# Patient Record
Sex: MALE | Race: OTHER | Employment: OTHER | URBAN - METROPOLITAN AREA
[De-identification: names, ages, dates, MRNs, and addresses within clinical notes are randomized per-mention and may not be internally consistent; named-entity substitution may affect disease eponyms.]

---

## 2017-09-14 ENCOUNTER — PREPPED CHART (OUTPATIENT)
Dept: URBAN - METROPOLITAN AREA CLINIC 43 | Facility: CLINIC | Age: 67
End: 2017-09-14

## 2018-01-03 ENCOUNTER — ESTABLISHED COMPREHENSIVE EXAM (OUTPATIENT)
Dept: URBAN - METROPOLITAN AREA CLINIC 43 | Facility: CLINIC | Age: 68
End: 2018-01-03

## 2018-01-03 DIAGNOSIS — Z96.1: ICD-10-CM

## 2018-01-03 DIAGNOSIS — H04.123: ICD-10-CM

## 2018-01-03 DIAGNOSIS — E11.9: ICD-10-CM

## 2018-01-03 DIAGNOSIS — H26.493: ICD-10-CM

## 2018-01-03 PROCEDURE — 92015 DETERMINE REFRACTIVE STATE: CPT

## 2018-01-03 PROCEDURE — 92014 COMPRE OPH EXAM EST PT 1/>: CPT

## 2018-01-03 ASSESSMENT — TONOMETRY
OD_IOP_MMHG: 14
OS_IOP_MMHG: 14

## 2018-01-03 ASSESSMENT — VISUAL ACUITY
OS_CC: 20/25-2
OD_SC: J5
OS_SC: J8
OD_SC: 20/40-3
OD_CC: 20/30+3
OD_CC: J2
OS_SC: 20/40
OS_CC: J3

## 2019-02-20 ENCOUNTER — ESTABLISHED COMPREHENSIVE EXAM (OUTPATIENT)
Dept: URBAN - METROPOLITAN AREA CLINIC 43 | Facility: CLINIC | Age: 69
End: 2019-02-20

## 2019-02-20 DIAGNOSIS — E11.3292: ICD-10-CM

## 2019-02-20 DIAGNOSIS — Z96.1: ICD-10-CM

## 2019-02-20 DIAGNOSIS — H04.123: ICD-10-CM

## 2019-02-20 DIAGNOSIS — H26.493: ICD-10-CM

## 2019-02-20 PROCEDURE — 92014 COMPRE OPH EXAM EST PT 1/>: CPT

## 2019-02-20 PROCEDURE — 92015 DETERMINE REFRACTIVE STATE: CPT

## 2019-02-20 ASSESSMENT — VISUAL ACUITY
OD_BAT: 20/200
OS_SC: 20/30-3
OS_SC: J6
OD_SC: 20/50+2
OD_SC: J6
OS_CC: 20/20-1
OD_CC: J6
OD_CC: 20/30+2

## 2019-02-20 ASSESSMENT — TONOMETRY
OD_IOP_MMHG: 14
OS_IOP_MMHG: 14

## 2019-09-04 NOTE — PATIENT DISCUSSION
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 Discussed with patient and his wife that surgery would help correct the problem. Patient does not desire surgery at this time. Would like to try the less aggressive approach with using lubricating drops. - Plan : Recommend using a gel drops QHS for lubrication. Suggestions : GenTeal Gel Drops. Glasses Rx updated and given.   RTC 1 year/EXAM

## 2020-05-14 ENCOUNTER — ESTABLISHED COMPREHENSIVE EXAM (OUTPATIENT)
Dept: URBAN - METROPOLITAN AREA CLINIC 43 | Facility: CLINIC | Age: 70
End: 2020-05-14

## 2020-05-14 DIAGNOSIS — Z96.1: ICD-10-CM

## 2020-05-14 DIAGNOSIS — H26.493: ICD-10-CM

## 2020-05-14 DIAGNOSIS — E11.9: ICD-10-CM

## 2020-05-14 PROCEDURE — 92015 DETERMINE REFRACTIVE STATE: CPT

## 2020-05-14 PROCEDURE — 92014 COMPRE OPH EXAM EST PT 1/>: CPT

## 2020-05-14 ASSESSMENT — VISUAL ACUITY
OS_SC: 20/40-2
OD_SC: 20/40
OS_CC: J2
OS_SC: J12
OD_SC: J10
OS_CC: 20/30-1
OD_CC: J3
OD_CC: 20/25

## 2020-05-14 ASSESSMENT — TONOMETRY
OD_IOP_MMHG: 13
OS_IOP_MMHG: 14

## 2021-06-04 ENCOUNTER — ESTABLISHED COMPREHENSIVE EXAM (OUTPATIENT)
Dept: URBAN - METROPOLITAN AREA CLINIC 43 | Facility: CLINIC | Age: 71
End: 2021-06-04

## 2021-06-04 DIAGNOSIS — H26.493: ICD-10-CM

## 2021-06-04 DIAGNOSIS — E11.9: ICD-10-CM

## 2021-06-04 DIAGNOSIS — Z96.1: ICD-10-CM

## 2021-06-04 PROCEDURE — 92014 COMPRE OPH EXAM EST PT 1/>: CPT

## 2021-06-04 PROCEDURE — 92015 DETERMINE REFRACTIVE STATE: CPT

## 2021-06-04 ASSESSMENT — VISUAL ACUITY
OS_SC: J10
OS_CC: 20/40-2
OS_SC: 20/40-1
OD_SC: 20/50+2
OD_CC: J3-
OD_BAT: 20/50
OD_SC: J10
OS_BAT: 20/50
OD_CC: 20/30-2
OS_CC: J3

## 2021-06-04 ASSESSMENT — TONOMETRY
OS_IOP_MMHG: 16
OD_IOP_MMHG: 14

## 2022-07-06 ENCOUNTER — COMPREHENSIVE EXAM (OUTPATIENT)
Dept: URBAN - METROPOLITAN AREA CLINIC 43 | Facility: CLINIC | Age: 72
End: 2022-07-06

## 2022-07-06 DIAGNOSIS — H04.123: ICD-10-CM

## 2022-07-06 DIAGNOSIS — E11.9: ICD-10-CM

## 2022-07-06 DIAGNOSIS — Z96.1: ICD-10-CM

## 2022-07-06 DIAGNOSIS — H26.493: ICD-10-CM

## 2022-07-06 PROCEDURE — 92014 COMPRE OPH EXAM EST PT 1/>: CPT

## 2022-07-06 PROCEDURE — 92015 DETERMINE REFRACTIVE STATE: CPT

## 2022-07-06 ASSESSMENT — VISUAL ACUITY
OD_CC: J4
OD_SC: 20/50+2
OS_SC: 20/40-2
OS_CC: 20/30-1
OS_SC: J12
OS_CC: J2
OD_SC: J10
OD_CC: 20/30+1

## 2022-07-06 ASSESSMENT — TONOMETRY
OS_IOP_MMHG: 14
OD_IOP_MMHG: 15

## 2023-07-13 ENCOUNTER — COMPREHENSIVE EXAM (OUTPATIENT)
Dept: URBAN - METROPOLITAN AREA CLINIC 43 | Facility: CLINIC | Age: 73
End: 2023-07-13

## 2023-07-13 DIAGNOSIS — H04.123: ICD-10-CM

## 2023-07-13 DIAGNOSIS — H26.493: ICD-10-CM

## 2023-07-13 DIAGNOSIS — E11.9: ICD-10-CM

## 2023-07-13 DIAGNOSIS — Z96.1: ICD-10-CM

## 2023-07-13 PROCEDURE — 92015 DETERMINE REFRACTIVE STATE: CPT

## 2023-07-13 PROCEDURE — 92014 COMPRE OPH EXAM EST PT 1/>: CPT

## 2023-07-13 ASSESSMENT — VISUAL ACUITY
OS_SC: 20/50
OU_CC: J2
OU_CC: 20/25
OS_CC: 20/25
OD_SC: 20/60
OS_BAT: 20/50
OD_CC: J3
OD_CC: 20/30
OD_SC: J12
OD_BAT: 20/100
OU_SC: 20/40
OS_SC: >J12
OS_CC: J2

## 2023-07-13 ASSESSMENT — TONOMETRY
OD_IOP_MMHG: 15
OS_IOP_MMHG: 17

## 2024-09-17 ENCOUNTER — COMPREHENSIVE EXAM (OUTPATIENT)
Dept: URBAN - METROPOLITAN AREA CLINIC 38 | Facility: CLINIC | Age: 74
End: 2024-09-17

## 2024-09-17 DIAGNOSIS — H04.123: ICD-10-CM

## 2024-09-17 DIAGNOSIS — H52.7: ICD-10-CM

## 2024-09-17 DIAGNOSIS — H26.493: ICD-10-CM

## 2024-09-17 DIAGNOSIS — H43.813: ICD-10-CM

## 2024-09-17 DIAGNOSIS — E11.9: ICD-10-CM

## 2024-09-17 PROCEDURE — 92015 DETERMINE REFRACTIVE STATE: CPT

## 2024-09-17 PROCEDURE — 92014 COMPRE OPH EXAM EST PT 1/>: CPT
